# Patient Record
(demographics unavailable — no encounter records)

---

## 2024-11-25 NOTE — RESULTS/DATA
[] : results reviewed [de-identified] : WNL [de-identified] : WNL [de-identified] : WNL [de-identified] : No change

## 2024-11-25 NOTE — CONSULT LETTER
[Dear  ___] : Dear  [unfilled], [Consult Letter:] : I had the pleasure of evaluating your patient, [unfilled]. [Please see my note below.] : Please see my note below. [Consult Closing:] : Thank you very much for allowing me to participate in the care of this patient.  If you have any questions, please do not hesitate to contact me. [Sincerely,] : Sincerely, [FreeTextEntry1] : Pre-Op evaluation [FreeTextEntry3] : Vikas louie MD

## 2024-11-25 NOTE — ASSESSMENT
[Patient Optimized for Surgery] : Patient optimized for surgery [No Further Testing Recommended] : no further testing recommended [Modify anticoagulant treatment prior to procedure] : Modify anticoagulant treatment prior to procedure [Modify medications prior to procedure] : Modify medications prior to procedure [FreeTextEntry4] : Pt is an acceptable candidate for planned surgery Pulmonary and cardiac clearances have been sent. [FreeTextEntry5] : Hold Xarelto 3  days prior to surgery. Restart as soon as possible [FreeTextEntry7] : Hold Torsemide on AM of surgery - continue other meds

## 2024-11-25 NOTE — REVIEW OF SYSTEMS
[Dyspnea on Exertion] : dyspnea on exertion [Joint Pain] : joint pain [Negative] : Psychiatric [de-identified] : On Xarelto - will stop 3 days before surgery.

## 2024-11-25 NOTE — HISTORY OF PRESENT ILLNESS
[Aortic Stenosis] : no aortic stenosis [Atrial Fibrillation] : atrial fibrillation [Coronary Artery Disease] : no coronary artery disease [Recent Myocardial Infarction] : no recent myocardial infarction [Implantable Device/Pacemaker] : no implantable device/pacemaker [Asthma] : no asthma [COPD] : no COPD [Sleep Apnea] : sleep apnea [Smoker] : not a smoker [No Adverse Anesthesia Reaction] : no adverse anesthesia reaction in self or family member [Chronic Anticoagulation] : chronic anticoagulation [Chronic Kidney Disease] : no chronic kidney disease [Diabetes] : no diabetes [FreeTextEntry1] : Right hip replacement [FreeTextEntry2] : 12/03/2024 [FreeTextEntry5] : Has loop recorder [FreeTextEntry3] : Dr. Moses [FreeTextEntry6] : Has chronic [place]

## 2024-11-25 NOTE — PHYSICAL EXAM
[Normal Sclera/Conjunctiva] : normal sclera/conjunctiva [PERRL] : pupils equal round and reactive to light [Normal Oropharynx] : the oropharynx was normal [No Respiratory Distress] : no respiratory distress  [No Accessory Muscle Use] : no accessory muscle use [Clear to Auscultation] : lungs were clear to auscultation bilaterally [Normal Rate] : normal rate  [Normal S1, S2] : normal S1 and S2 [No Murmur] : no murmur heard [Irregularly Irregular] : irregularly irregular [No Carotid Bruits] : no carotid bruits [Normal] : no posterior cervical lymphadenopathy and no anterior cervical lymphadenopathy [No Focal Deficits] : no focal deficits [Alert and Oriented x3] : oriented to person, place, and time [de-identified] : 1+ edema in calves - chronic [de-identified] : Right knee pain

## 2024-12-17 NOTE — ASSESSMENT
[FreeTextEntry1] : Pt is sp Right hip replacement and edema is worse - likely given a lot of IV fluids in hospital To increase Torsemide to 40 mg per day FU 2 weeks May need to add Sironoloactone.

## 2024-12-17 NOTE — HISTORY OF PRESENT ILLNESS
[FreeTextEntry1] : Had right hip replaced 2 weeks ago - legs more swollen - and ? blister on good leg Weight is up

## 2024-12-17 NOTE — PHYSICAL EXAM
[Normal Sclera/Conjunctiva] : normal sclera/conjunctiva [Normal S1, S2] : normal S1 and S2 [Irregularly Irregular] : irregularly irregular [Normal] : soft, non-tender, non-distended, no masses palpated, no HSM and normal bowel sounds [de-identified] : 2+ edema both legs - no distinct blister [de-identified] : Right hip wound is OK

## 2024-12-30 NOTE — ASSESSMENT
[FreeTextEntry1] : Pt with above medical issues. Bloods drawn in office. Continue Torsemide at 40 mg per day FU 2 weeks Going to see cardiology also.

## 2024-12-30 NOTE — PHYSICAL EXAM
[Normal Sclera/Conjunctiva] : normal sclera/conjunctiva [Normal Rate] : normal rate  [Normal S1, S2] : normal S1 and S2 [Irregularly Irregular] : irregularly irregular [Normal] : soft, non-tender, non-distended, no masses palpated, no HSM and normal bowel sounds [de-identified] : 2+ edema bilaterally - but decreased from last visit

## 2025-01-13 NOTE — PHYSICAL EXAM
[Normal Sclera/Conjunctiva] : normal sclera/conjunctiva [Normal S1, S2] : normal S1 and S2 [Irregularly Irregular] : irregularly irregular [Normal] : soft, non-tender, non-distended, no masses palpated, no HSM and normal bowel sounds [de-identified] : 1-2+ edema bilaterally - improved from last visit

## 2025-01-13 NOTE — HISTORY OF PRESENT ILLNESS
[FreeTextEntry1] : FU for edema - losing weight - less swelling Told by oncology more anemic  -but had hip replacement 12/03/2024 - Hb was 11 last week No BRBPR or melena Echocardiogram on Friday was OK

## 2025-01-13 NOTE — ASSESSMENT
[FreeTextEntry1] : Pt with above medical issues. Leg edema is better To have records sent here Bloods drawn in office. Continue meds FU 3 weeks CC

## 2025-02-07 NOTE — REVIEW OF SYSTEMS
[Fever] : no fever [Recent Change In Weight] : ~T recent weight change [Chest Pain] : no chest pain [Palpitations] : no palpitations [Dyspnea on Exertion] : dyspnea on exertion [Negative] : Psychiatric [FreeTextEntry3] : last optho < 1 year [FreeTextEntry9] : sees ortho [de-identified] : sees dermatologist [de-identified] : sees Hematologist

## 2025-02-07 NOTE — ASSESSMENT
[FreeTextEntry1] :  Pt for well exam. I have advised the patient should consider getting the following vaccinations: Tdap Labs reviewed. Had hip replacement surgery in December - had some blood loss then Continue meds Continue diet and exercise FU 3 months Health counseling given.

## 2025-02-07 NOTE — HEALTH RISK ASSESSMENT
[Fair] :  ~his/her~ mood as fair [Yes] : Yes [Monthly or less (1 pt)] : Monthly or less (1 point) [1 or 2 (0 pts)] : 1 or 2 (0 points) [Never (0 pts)] : Never (0 points) [No] : In the past 12 months have you used drugs other than those required for medical reasons? No [Any fall with injury in past year] : Patient reported fall with injury in the past year [PHQ-2 Negative - No further assessment needed] : PHQ-2 Negative - No further assessment needed [de-identified] : PT [de-identified] : Low fat and salt [de-identified] : Tripped over carmelita [Former] : Former [0-4] : 0-4 [> 15 Years] : > 15 Years [NO] : No [Patient declined PAP Smear] : Patient declined PAP Smear [Change in mental status noted] : No change in mental status noted [Language] : denies difficulty with language [Behavior] : denies difficulty with behavior [Learning/Retaining New Information] : denies difficulty learning/retaining new information [Handling Complex Tasks] : denies difficulty handling complex tasks [Reasoning] : denies difficulty with reasoning [Spatial Ability and Orientation] : denies difficulty with spatial ability and orientation [None] : None [Alone] : lives alone [Retired] : retired [] :  [Feels Safe at Home] : Feels safe at home [Fully functional (bathing, dressing, toileting, transferring, walking, feeding)] : Fully functional (bathing, dressing, toileting, transferring, walking, feeding) [Fully functional (using the telephone, shopping, preparing meals, housekeeping, doing laundry, using] : Fully functional and needs no help or supervision to perform IADLs (using the telephone, shopping, preparing meals, housekeeping, doing laundry, using transportation, managing medications and managing finances) [Smoke Detector] : smoke detector [Carbon Monoxide Detector] : carbon monoxide detector [Seat Belt] :  uses seat belt [Sunscreen] : uses sunscreen [With Patient/Caregiver] : , with patient/caregiver [Designated Healthcare Proxy] : Designated healthcare proxy [Name: ___] : Health Care Proxy's Name: [unfilled]  [Time Spent: ___ minutes] : Time Spent: [unfilled] minutes [AdvancecareDate] : 02/25 [FreeTextEntry4] : Had long discussion with the patient. Discussed with pt the need for a health care proxy. Discussed who can be a proxy, forms given if needed, and the need for the proxy to know their wishes and to make sure they will comply. The proxy will need to have a copy in their home and the patient should have a copy. Son has a copy of the proxy - wishes are known

## 2025-02-07 NOTE — PHYSICAL EXAM
[No Acute Distress] : no acute distress [Well Nourished] : well nourished [Well Developed] : well developed [Well-Appearing] : well-appearing [Normal Sclera/Conjunctiva] : normal sclera/conjunctiva [PERRL] : pupils equal round and reactive to light [EOMI] : extraocular movements intact [Normal Outer Ear/Nose] : the outer ears and nose were normal in appearance [Normal Oropharynx] : the oropharynx was normal [Normal TMs] : both tympanic membranes were normal [No JVD] : no jugular venous distention [No Lymphadenopathy] : no lymphadenopathy [Supple] : supple [Thyroid Normal, No Nodules] : the thyroid was normal and there were no nodules present [No Respiratory Distress] : no respiratory distress  [No Accessory Muscle Use] : no accessory muscle use [Clear to Auscultation] : lungs were clear to auscultation bilaterally [Normal Rate] : normal rate  [Normal S1, S2] : normal S1 and S2 [No Murmur] : no murmur heard [Irregularly Irregular] : irregularly irregular [No Carotid Bruits] : no carotid bruits [No Abdominal Bruit] : a ~M bruit was not heard ~T in the abdomen [No Varicosities] : no varicosities [Pedal Pulses Present] : the pedal pulses are present [No Palpable Aorta] : no palpable aorta [No Extremity Clubbing/Cyanosis] : no extremity clubbing/cyanosis [Normal Appearance] : normal in appearance [No Nipple Discharge] : no nipple discharge [No Axillary Lymphadenopathy] : no axillary lymphadenopathy [Soft] : abdomen soft [Non Tender] : non-tender [Non-distended] : non-distended [No Masses] : no abdominal mass palpated [No HSM] : no HSM [Normal Bowel Sounds] : normal bowel sounds [Declined Rectal Exam] : declined rectal exam [Normal Supraclavicular Nodes] : no supraclavicular lymphadenopathy [Normal Axillary Nodes] : no axillary lymphadenopathy [Normal Posterior Cervical Nodes] : no posterior cervical lymphadenopathy [Normal Anterior Cervical Nodes] : no anterior cervical lymphadenopathy [No CVA Tenderness] : no CVA  tenderness [No Spinal Tenderness] : no spinal tenderness [No Joint Swelling] : no joint swelling [Grossly Normal Strength/Tone] : grossly normal strength/tone [No Rash] : no rash [Coordination Grossly Intact] : coordination grossly intact [No Focal Deficits] : no focal deficits [Deep Tendon Reflexes (DTR)] : deep tendon reflexes were 2+ and symmetric [Normal Affect] : the affect was normal [Alert and Oriented x3] : oriented to person, place, and time [Normal Insight/Judgement] : insight and judgment were intact [de-identified] : 1-2+ edema in legs Left > Right - somewhat improved [de-identified] : Declines [de-identified] : Uses a cane

## 2025-06-13 NOTE — PHYSICAL EXAM
[Normal Sclera/Conjunctiva] : normal sclera/conjunctiva [Normal S1, S2] : normal S1 and S2 [Irregularly Irregular] : irregularly irregular [Normal] : soft, non-tender, non-distended, no masses palpated, no HSM and normal bowel sounds [Alert and Oriented x3] : oriented to person, place, and time [de-identified] : 1+ edema Left > Right

## 2025-06-13 NOTE — ASSESSMENT
[FreeTextEntry1] : Pt with above medical issues. Labs reviewed. Taking 1 iron per day Continue meds FU 3 months

## 2025-06-13 NOTE — HISTORY OF PRESENT ILLNESS
[FreeTextEntry1] : FU for prediabetes, hyperlipidemia, mild anemia - improving, LE edema Watching diet No BRBPR

## 2025-06-26 NOTE — ASSESSMENT
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  They also discussed erosive osteoarthritis diagnosis  Patient has had pain in her right hip for which he saw ortho and received hip injection for bursitis by them , now s/p hip replacement in 12/2024  Patient does not have inflammatory joint pain, malar rash, photosensitivity, sicca symptoms, Raynauds. Based on existing labs, patient does not have anemia, leukopenia, thrombocytopenia, kidney disease. Exam without synovitis, rashes, changes of Raynauds.   Patient is here for follow up of hand pain Rheumatological workup done at initial visit was normal  Imaging consistent with OA  (hand and hip),  Her hand symptoms are from erosive OA  Since she had injections with hand orthopedics, her trigger finger has improved She has hand pain from hand osteoarthritis  Regarding hip , she is now s/p replacement      - Regarding hand OA, advised paraffin wax, Tylenol arthritis, as well squeeze ball exercises . She wants to discuss with her cardiologist if she can take NSAIDs once in a while or use lidocaine patches/gel for pain.  -I have also given her OT for hand OA - If recurrence of trigger finger, she can see hand ortho again for injections   =====

## 2025-06-26 NOTE — PHYSICAL EXAM
[TextEntry] :     GENERAL: Appears in no acute distress HEENT: EOMI. No conjunctival erythema. Moist mucous membranes. No nasopharyngeal ulcers CARDIOVASCULAR: RRR. S1, S2 auscultated. PULMONARY: Clear to auscultation b/l, no wheezes, rales, or crackles MSK: No active synovitis, swelling, erythema, or warmth. No joint tenderness to palpation. Mild DIP ulnar variance of index fingers No dactylitis, enthesitis, SKIN: No lesions or rashes PSYCH:  Normal affect and thought process.

## 2025-06-26 NOTE — HISTORY OF PRESENT ILLNESS
[FreeTextEntry1] : 82-year-old female with past medical history of lymphoma (remission for 20 years; 2004 received chemo for 6 months), lung surgery (cancer) 5-6 years ago, squamous cell /basal cell carcinoma skin, atrial fibrillation, hyperlipidemia,, atypical ductal hyperplasia breast (biopsy was benign in 1/2024), trigger finger, OA of b/l hands , left hip replacement , lumbar disc surgery s/p low back surgery 10 years ago, , sleep apnea  ============ As per initial note on 2.29.24  Patient saw orthopedics on February 8 and received trigger finger injections in 3rd and 4th finger for trigger finger. They also discussed erosive osteoarthritis diagnosis  Patient has had pain in her right hip for which he saw ortho and received hip injection for bursitis by them. This was in May. The injection helped. And again has recurrent pain now which she thinks is due to a muscle strain during exercise. She has pain in the right lateral hip area after prolonged walking  She started having difficulty closing her fingers in the left hand which got worse over time. She saw ortho and got injections which helped. This was 2/8/24 and 1 week later her pain resolved with full mobility and no pain  Patient denied fatigue, joint pains, joint swelling, joint erythema/warmth, no prolonged morning stiffness, f chest pain, abdominal pain, diarrhea, photosensitivity, Raynaud's, alopecia, dry eyes, dry mouth, headaches, eye pain/redness, vision changes, numbness/tingling, , Hx of DVT/PEs.  ================    Rheumatological workup done at initial visit was normal  Imaging consistent with OA  (hand and hip)  Since initial visit - patient had hip replacement surgery right side with ortho 12/2024- did PT after that - patient with worsening right hand pain, pain with  motion. Denies trigger fingers. Denies joint inflammation  Labs   1/2024  Normal/negative CBC CMP   Labs 2/29/2024  Normal/negative RF, CCP, 14.33 ETA protein, ESR, CRP   Hand ortho did xrays as below  Xray 2/8/24   4 views left hand and wrist   Wrist, carpals, basal joint, MP joints without notable degenerative change.  Marked erosive change PIP 3, 4, 5; DIP 2, 3, 4, 5.  Moderate sclerosis and narrowing thumb IP joint.  No fractures or dislocations.   Xr hip   IMPRESSION:  1.  Mild osteoarthritis of the right hip.  2.  Left total hip arthroplasty.   Xr hand   IMPRESSION:    Right  1.  Erosive osteoarthritis of the interphalangeal joints.  Left  1.  Erosive osteoarthritis of the interphalangeal joints.       Hand U/S  IMPRESSION: Advanced arthrosis about the bilateral interphalangeal joints without surrounding synovial hyperemia/hypertrophy.